# Patient Record
(demographics unavailable — no encounter records)

---

## 2024-10-21 NOTE — ASSESSMENT
[FreeTextEntry1] : This is a 64-year-old woman with a history of migraines for many years that has transformed into a chronic daily pattern. Brain imaging and sedimentation rate were unremarkable. She had side effects from topiramate, nortriptyline, and propranolol in the past.  She is currently on valproic acid 500 mg. She is not taking it twice a day with some improvement in her headache pattern.  She had side effects at higher doses. I will continue the current regimen.  I will see her back in 6 months.

## 2024-10-21 NOTE — CONSULT LETTER
[Dear  ___] : Dear ~CYNTHIA, [Courtesy Letter:] : I had the pleasure of seeing your patient, [unfilled], in my office today. [Please see my note below.] : Please see my note below. [Consult Closing:] : Thank you very much for allowing me to participate in the care of this patient.  If you have any questions, please do not hesitate to contact me. [Sincerely,] : Sincerely, [FreeTextEntry3] : Tobin Abel MD.

## 2024-10-21 NOTE — HISTORY OF PRESENT ILLNESS
[FreeTextEntry1] : I saw this patient in the office today.  She has a long history of chronic headaches for many years. These have been waxing and waning over the years. In the past she had been tried on several medications for prophylaxis but had side effects. These included Topamax, Pamelor, and Inderal. She had ultimately been tried on Depakote which improved her headache pattern. This was titrated to 500 mg twice per day. She had last been seen in 2019 at which point she described that her best control with this regimen.  She had been off the medication for the past few years, and the headaches had returned. I had restarted valproic acid.  4/16/2024 visit: She had only been taking the valproic acid once per day she had only been taking the valproic acid once per day as she reported that she had side effects from it again. She had side effects at 3 times per day. Her headaches are again daily.  10/21/2024 visit: She now reports only occasional headaches.

## 2024-10-21 NOTE — DATA REVIEWED
[de-identified] : Brain MRI was performed on 2/18/2023.\par  The study was unremarkable.\par   [de-identified] : Sedimentation rate was 13

## 2025-04-21 NOTE — DATA REVIEWED
[de-identified] : Brain MRI was performed on 2/18/2023.\par  The study was unremarkable.\par   [de-identified] : Sedimentation rate was 13

## 2025-04-21 NOTE — HISTORY OF PRESENT ILLNESS
[FreeTextEntry1] : I saw this patient in the office today.  She has a long history of chronic headaches for many years. These have been waxing and waning over the years. In the past she had been tried on several medications for prophylaxis but had side effects. These included Topamax, Pamelor, and Inderal. She had ultimately been tried on Depakote which improved her headache pattern. This was titrated to 500 mg twice per day. She had last been seen in 2019 at which point she described that her best control with this regimen.  She had been off the medication for the past few years, and the headaches had returned. I had restarted valproic acid.  4/21/25 visit: She has been taking the valproic acid twice per day. She now reports only occasional headaches.

## 2025-04-21 NOTE — ASSESSMENT
[FreeTextEntry1] : This is a 64-year-old woman with a history of migraines for many years that has transformed into a chronic daily pattern. Brain imaging and sedimentation rate were unremarkable. She had side effects from topiramate, nortriptyline, and propranolol in the past.  She is currently on valproic acid 500 mg. She is taking it twice a day with improvement in her headache pattern.  She had side effects at higher doses. I will continue the current regimen.  I will see her back in 6 months.